# Patient Record
Sex: MALE | Race: WHITE | NOT HISPANIC OR LATINO | Employment: OTHER | URBAN - METROPOLITAN AREA
[De-identification: names, ages, dates, MRNs, and addresses within clinical notes are randomized per-mention and may not be internally consistent; named-entity substitution may affect disease eponyms.]

---

## 2020-06-16 ENCOUNTER — OFFICE VISIT (OUTPATIENT)
Dept: FAMILY MEDICINE CLINIC | Facility: CLINIC | Age: 50
End: 2020-06-16
Payer: COMMERCIAL

## 2020-06-16 VITALS
TEMPERATURE: 98 F | SYSTOLIC BLOOD PRESSURE: 140 MMHG | OXYGEN SATURATION: 96 % | HEIGHT: 71 IN | WEIGHT: 219.6 LBS | DIASTOLIC BLOOD PRESSURE: 95 MMHG | HEART RATE: 97 BPM | RESPIRATION RATE: 16 BRPM | BODY MASS INDEX: 30.74 KG/M2

## 2020-06-16 DIAGNOSIS — Z13.220 SCREENING FOR LIPID DISORDERS: ICD-10-CM

## 2020-06-16 DIAGNOSIS — I10 ESSENTIAL HYPERTENSION: ICD-10-CM

## 2020-06-16 DIAGNOSIS — Z12.5 SCREENING FOR PROSTATE CANCER: ICD-10-CM

## 2020-06-16 DIAGNOSIS — M76.891 TENDONITIS OF KNEE, RIGHT: Primary | ICD-10-CM

## 2020-06-16 DIAGNOSIS — Z13.0 SCREENING FOR ENDOCRINE, METABOLIC AND IMMUNITY DISORDER: ICD-10-CM

## 2020-06-16 DIAGNOSIS — Z13.228 SCREENING FOR ENDOCRINE, METABOLIC AND IMMUNITY DISORDER: ICD-10-CM

## 2020-06-16 DIAGNOSIS — Z13.29 SCREENING FOR ENDOCRINE, METABOLIC AND IMMUNITY DISORDER: ICD-10-CM

## 2020-06-16 DIAGNOSIS — Z13.0 SCREENING FOR DEFICIENCY ANEMIA: ICD-10-CM

## 2020-06-16 DIAGNOSIS — Z13.1 SCREENING FOR DIABETES MELLITUS (DM): ICD-10-CM

## 2020-06-16 PROBLEM — M77.9 TENDONITIS: Status: ACTIVE | Noted: 2020-06-16

## 2020-06-16 PROCEDURE — 99203 OFFICE O/P NEW LOW 30 MIN: CPT | Performed by: FAMILY MEDICINE

## 2020-06-16 PROCEDURE — 3008F BODY MASS INDEX DOCD: CPT | Performed by: FAMILY MEDICINE

## 2020-06-16 PROCEDURE — 3077F SYST BP >= 140 MM HG: CPT | Performed by: FAMILY MEDICINE

## 2020-06-16 PROCEDURE — 3080F DIAST BP >= 90 MM HG: CPT | Performed by: FAMILY MEDICINE

## 2020-07-16 LAB
ALBUMIN SERPL-MCNC: 4.2 G/DL (ref 3.8–4.9)
ALBUMIN/GLOB SERPL: 1.6 {RATIO} (ref 1.2–2.2)
ALP SERPL-CCNC: 48 IU/L (ref 39–117)
ALT SERPL-CCNC: 16 IU/L (ref 0–44)
AST SERPL-CCNC: 18 IU/L (ref 0–40)
BASOPHILS # BLD AUTO: 0.1 X10E3/UL (ref 0–0.2)
BASOPHILS NFR BLD AUTO: 2 %
BILIRUB SERPL-MCNC: 0.3 MG/DL (ref 0–1.2)
BUN SERPL-MCNC: 12 MG/DL (ref 6–24)
BUN/CREAT SERPL: 14 (ref 9–20)
CALCIUM SERPL-MCNC: 9.1 MG/DL (ref 8.7–10.2)
CHLORIDE SERPL-SCNC: 100 MMOL/L (ref 96–106)
CHOLEST SERPL-MCNC: 150 MG/DL (ref 100–199)
CO2 SERPL-SCNC: 23 MMOL/L (ref 20–29)
CREAT SERPL-MCNC: 0.88 MG/DL (ref 0.76–1.27)
EOSINOPHIL # BLD AUTO: 0.1 X10E3/UL (ref 0–0.4)
EOSINOPHIL NFR BLD AUTO: 3 %
ERYTHROCYTE [DISTWIDTH] IN BLOOD BY AUTOMATED COUNT: 13 % (ref 11.6–15.4)
GLOBULIN SER-MCNC: 2.6 G/DL (ref 1.5–4.5)
GLUCOSE SERPL-MCNC: 108 MG/DL (ref 65–99)
HCT VFR BLD AUTO: 43.7 % (ref 37.5–51)
HDLC SERPL-MCNC: 61 MG/DL
HGB BLD-MCNC: 14.7 G/DL (ref 13–17.7)
IMM GRANULOCYTES # BLD: 0 X10E3/UL (ref 0–0.1)
IMM GRANULOCYTES NFR BLD: 1 %
LDLC SERPL CALC-MCNC: 71 MG/DL (ref 0–99)
LDLC/HDLC SERPL: 1.2 RATIO (ref 0–3.6)
LYMPHOCYTES # BLD AUTO: 1.9 X10E3/UL (ref 0.7–3.1)
LYMPHOCYTES NFR BLD AUTO: 36 %
MCH RBC QN AUTO: 30.4 PG (ref 26.6–33)
MCHC RBC AUTO-ENTMCNC: 33.6 G/DL (ref 31.5–35.7)
MCV RBC AUTO: 90 FL (ref 79–97)
MONOCYTES # BLD AUTO: 0.6 X10E3/UL (ref 0.1–0.9)
MONOCYTES NFR BLD AUTO: 11 %
NEUTROPHILS # BLD AUTO: 2.5 X10E3/UL (ref 1.4–7)
NEUTROPHILS NFR BLD AUTO: 47 %
PLATELET # BLD AUTO: 226 X10E3/UL (ref 150–450)
POTASSIUM SERPL-SCNC: 4.6 MMOL/L (ref 3.5–5.2)
PROT SERPL-MCNC: 6.8 G/DL (ref 6–8.5)
PSA FREE MFR SERPL: 17.1 %
PSA FREE SERPL-MCNC: 0.12 NG/ML
PSA SERPL-MCNC: 0.7 NG/ML (ref 0–4)
RBC # BLD AUTO: 4.84 X10E6/UL (ref 4.14–5.8)
SL AMB EGFR AFRICAN AMERICAN: 114 ML/MIN/1.73
SL AMB EGFR NON AFRICAN AMERICAN: 99 ML/MIN/1.73
SL AMB VLDL CHOLESTEROL CALC: 18 MG/DL (ref 5–40)
SODIUM SERPL-SCNC: 138 MMOL/L (ref 134–144)
TRIGL SERPL-MCNC: 88 MG/DL (ref 0–149)
TSH SERPL DL<=0.005 MIU/L-ACNC: 2.69 UIU/ML (ref 0.45–4.5)
WBC # BLD AUTO: 5.2 X10E3/UL (ref 3.4–10.8)

## 2021-05-21 ENCOUNTER — VBI (OUTPATIENT)
Dept: ADMINISTRATIVE | Facility: OTHER | Age: 51
End: 2021-05-21

## 2022-04-26 ENCOUNTER — VBI (OUTPATIENT)
Dept: ADMINISTRATIVE | Facility: OTHER | Age: 52
End: 2022-04-26

## 2023-04-24 ENCOUNTER — VBI (OUTPATIENT)
Dept: ADMINISTRATIVE | Facility: OTHER | Age: 53
End: 2023-04-24

## 2024-02-21 PROBLEM — Z13.29 SCREENING FOR ENDOCRINE, METABOLIC AND IMMUNITY DISORDER: Status: RESOLVED | Noted: 2020-06-16 | Resolved: 2024-02-21

## 2024-02-21 PROBLEM — Z12.5 SCREENING FOR PROSTATE CANCER: Status: RESOLVED | Noted: 2020-06-16 | Resolved: 2024-02-21

## 2024-02-21 PROBLEM — Z13.0 SCREENING FOR DEFICIENCY ANEMIA: Status: RESOLVED | Noted: 2020-06-16 | Resolved: 2024-02-21

## 2024-02-21 PROBLEM — Z13.228 SCREENING FOR ENDOCRINE, METABOLIC AND IMMUNITY DISORDER: Status: RESOLVED | Noted: 2020-06-16 | Resolved: 2024-02-21

## 2024-02-21 PROBLEM — Z13.220 SCREENING FOR LIPID DISORDERS: Status: RESOLVED | Noted: 2020-06-16 | Resolved: 2024-02-21

## 2024-02-21 PROBLEM — Z13.1 SCREENING FOR DIABETES MELLITUS (DM): Status: RESOLVED | Noted: 2020-06-16 | Resolved: 2024-02-21

## 2024-02-21 PROBLEM — Z13.0 SCREENING FOR ENDOCRINE, METABOLIC AND IMMUNITY DISORDER: Status: RESOLVED | Noted: 2020-06-16 | Resolved: 2024-02-21

## 2024-05-01 ENCOUNTER — VBI (OUTPATIENT)
Dept: ADMINISTRATIVE | Facility: OTHER | Age: 54
End: 2024-05-01

## 2024-12-30 ENCOUNTER — PREP FOR PROCEDURE (OUTPATIENT)
Age: 54
End: 2024-12-30

## 2024-12-30 ENCOUNTER — OFFICE VISIT (OUTPATIENT)
Dept: FAMILY MEDICINE CLINIC | Facility: CLINIC | Age: 54
End: 2024-12-30
Payer: COMMERCIAL

## 2024-12-30 ENCOUNTER — TELEPHONE (OUTPATIENT)
Age: 54
End: 2024-12-30

## 2024-12-30 VITALS
TEMPERATURE: 98.3 F | BODY MASS INDEX: 32.09 KG/M2 | HEART RATE: 81 BPM | DIASTOLIC BLOOD PRESSURE: 88 MMHG | SYSTOLIC BLOOD PRESSURE: 136 MMHG | WEIGHT: 224.12 LBS | OXYGEN SATURATION: 98 % | HEIGHT: 70 IN

## 2024-12-30 DIAGNOSIS — J31.0 RHINITIS, UNSPECIFIED TYPE: ICD-10-CM

## 2024-12-30 DIAGNOSIS — Z00.00 ANNUAL PHYSICAL EXAM: Primary | ICD-10-CM

## 2024-12-30 DIAGNOSIS — Z12.11 ENCOUNTER FOR COLORECTAL CANCER SCREENING: ICD-10-CM

## 2024-12-30 DIAGNOSIS — Z12.2 SCREENING FOR LUNG CANCER: ICD-10-CM

## 2024-12-30 DIAGNOSIS — Z12.12 ENCOUNTER FOR COLORECTAL CANCER SCREENING: ICD-10-CM

## 2024-12-30 DIAGNOSIS — M72.2 PLANTAR FASCIITIS OF RIGHT FOOT: ICD-10-CM

## 2024-12-30 DIAGNOSIS — Z12.5 SCREENING FOR PROSTATE CANCER: ICD-10-CM

## 2024-12-30 DIAGNOSIS — B35.3 TINEA PEDIS OF RIGHT FOOT: ICD-10-CM

## 2024-12-30 DIAGNOSIS — Z13.228 SCREENING FOR METABOLIC DISORDER: ICD-10-CM

## 2024-12-30 DIAGNOSIS — Z12.11 SCREENING FOR COLON CANCER: Primary | ICD-10-CM

## 2024-12-30 PROCEDURE — 99203 OFFICE O/P NEW LOW 30 MIN: CPT

## 2024-12-30 PROCEDURE — 99386 PREV VISIT NEW AGE 40-64: CPT

## 2024-12-30 RX ORDER — FLUTICASONE PROPIONATE 50 MCG
1 SPRAY, SUSPENSION (ML) NASAL DAILY
Qty: 16 ML | Refills: 2 | Status: SHIPPED | OUTPATIENT
Start: 2024-12-30

## 2024-12-30 RX ORDER — PREDNISONE 10 MG/1
10 TABLET ORAL SEE ADMIN INSTRUCTIONS
Qty: 20 TABLET | Refills: 0 | Status: SHIPPED | OUTPATIENT
Start: 2024-12-30

## 2024-12-30 NOTE — TELEPHONE ENCOUNTER
Scheduled date of colonoscopy (as of today):01/09/25  Physician performing colonoscopy: Izzy  Location of colonoscopy: [facility]  Bowel prep reviewed with patient: LIZBETH/TINA sent to email  Instructions reviewed with patient by: JOE  Clearances: N/A    : Yoanna Moraes [phone]

## 2024-12-30 NOTE — TELEPHONE ENCOUNTER
12/30/24  Screened by: Violet Duran    Referring Provider Amber    Pre- Screening:     There is no height or weight on file to calculate BMI.  Has patient been referred for a routine screening Colonoscopy? yes  Is the patient between 45-75 years old? yes      Previous Colonoscopy no   If yes:    Date:     Facility:     Reason:       Does the patient want to see a Gastroenterologist prior to their procedure OR are they having any GI symptoms? no    Has the patient been hospitalized or had abdominal surgery in the past 6 months? no    Does the patient use supplemental oxygen? no    Does the patient take Coumadin, Lovenox, Plavix, Elliquis, Xarelto, or other blood thinning medication? no    Has the patient had a stroke, cardiac event, or stent placed in the past year? no      If patient is between 45yrs - 49yrs, please advise patient that we will have to confirm benefits & coverage with their insurance company for a routine screening colonoscopy.

## 2024-12-30 NOTE — PROGRESS NOTES
Adult Annual Physical  Name: Mynor Lima      : 1970      MRN: [id number]  Encounter Provider: Christiana Sauer DO  Encounter Date: 2024   Encounter department: [facility]    Assessment & Plan  Annual physical exam  -colorectal ca screening: DUE, referral to GI given for colonoscopy today  -lung ca screening: DUE, pt is a current 1/2 PPD smoker with 20+ pack-year history  -prostate ca screeening: PSA ordered today  -annual labwork ordered today       Encounter for colorectal cancer screening    Orders:    Ambulatory Referral to Gastroenterology; Future    Ambulatory Referral to Podiatry; Future    Screening for metabolic disorder    Orders:    TSH, 3rd generation with Free T4 reflex; Future    Comprehensive metabolic panel; Future    CBC and Platelet; Future    Lipid panel; Future    Hemoglobin A1C; Future    Screening for prostate cancer    Orders:    PSA Total (Reflex To Free); Future    Screening for lung cancer  I discussed with him that he is a candidate for lung cancer CT screening.     The following Shared Decision-Making points were covered:  Benefits of screening were discussed, including the rates of reduction in death from lung cancer and other causes.  Harms of screening were reviewed, including false positive tests, radiation exposure levels, risks of invasive procedures, risks of complications of screening, and risk of overdiagnosis.  I counseled on the importance of adherence to annual lung cancer LDCT screening, impact of co-morbidities, and ability or willingness to undergo diagnosis and treatment.  I counseled on the importance of maintaining abstinence as a former smoker or was counseled on the importance of smoking cessation if a current smoker    Review of Eligibility Criteria: He meets all of the criteria for Lung Cancer Screening.   He is 54 y.o.   He has 20 pack year tobacco history and is a current smoker or has quit within the past 15 years  He presents no  signs or symptoms of lung cancer    After discussion, the patient decided to elect lung cancer screening.    Orders:    CT lung screening program; Future    Plantar fasciitis of right foot  -suspect plantar fasciitis   -pt would like to try short course of prednisone as it has helped him before, can try prednisone taper   -recommend podiatry referral for eval for orthotics, pt will consider. will place referral today  Orders:    predniSONE 10 mg tablet; Take 1 tablet (10 mg total) by mouth see administration instructions Take 40mg (4 tablets) for 2 days, then take 30mg (3 tablets) for 2 days, then take 20mg (2 tablets) for 2 days, then take 10mg (1 tablet) for 2 days.    Rhinitis, unspecified type    Orders:    fluticasone (FLONASE) 50 mcg/act nasal spray; 1 spray into each nostril daily    Tinea pedis of right foot  -offered prescription for topical antifungal, pt declines stating he has had chronic athletes foot for years it does not bother him       Immunizations and preventive care screenings were discussed with patient today. Appropriate education was printed on patient's after visit summary.    Discussed risks and benefits of prostate cancer screening. We discussed the controversial history of PSA screening for prostate cancer in the [place] as well as the risk of over detection and over treatment of prostate cancer by way of PSA screening.  The patient understands that PSA blood testing is an imperfect way to screen for prostate cancer and that elevated PSA levels in the blood may also be caused by infection, inflammation, prostatic trauma or manipulation, urological procedures, or by benign prostatic enlargement.    The role of the digital rectal examination in prostate cancer screening was also discussed and I discussed with him that there is large interobserver variability in the findings of digital rectal examination.    Counseling:  Alcohol/drug use: discussed moderation in alcohol intake, the  "recommendations for healthy alcohol use, and avoidance of illicit drug use.  Dental Health: discussed importance of regular tooth brushing, flossing, and dental visits.  Injury prevention: discussed safety/seat belts, safety helmets, smoke detectors, carbon monoxide detectors, and smoking near bedding or upholstery.  Exercise: the importance of regular exercise/physical activity was discussed. Recommend exercise 3-5 times per week for at least 30 minutes.          History of Present Illness     Adult Annual Physical:  Patient presents for annual physical. Patient presents to [facility] care, has not been seen in several years and would like to get up to date with his labwork and preventative screenings.  Has one acute complaint:   Fell off a ladder in 2020 and hyperextended his toes on his right foot. Pain at that time improved with course of prednisone. Now with new discomfort on plantar surface of feet, feels like he is \"walking on crumpled up socks\" when he walks sometimes and it looks like the space inbetween his 3rd and 4th toes is greater on the right side than the left side. .     Diet and Physical Activity:  - Diet/Nutrition: well balanced diet.  - Exercise: no formal exercise.    Depression Screening:  - PHQ-2 Score: 0    General Health:  - Sleep: sleeps well, 4-6 hours of sleep on average and snores loudly.  - Hearing: normal hearing bilateral ears.  - Vision: wears contacts, goes for regular eye exams and no vision problems.  - Dental: brushes teeth twice daily and regular dental visits.     Health:  - History of STDs: no.   - Urinary symptoms: none.     Review of Systems   Constitutional:  Negative for chills and fever.   HENT:  Negative for ear pain and sore throat.    Eyes:  Negative for pain and visual disturbance.   Respiratory:  Negative for cough and shortness of breath.    Cardiovascular:  Negative for chest pain and palpitations.   Gastrointestinal:  Negative for abdominal pain and vomiting. " "  Genitourinary:  Negative for dysuria and hematuria.   Musculoskeletal:  Negative for arthralgias and back pain.   Skin:  Negative for color change and rash.   Neurological:  Negative for seizures and syncope.   All other systems reviewed and are negative.        Objective   /88   Pulse 81   Temp 98.3 °F (36.8 °C) (Tympanic)   Ht 5' 10.47\" (1.79 m)   Wt 102 kg (224 lb 1.9 oz)   SpO2 98%   BMI 31.73 kg/m²     Physical Exam  Vitals and nursing note reviewed.   Constitutional:       General: He is not in acute distress.     Appearance: Normal appearance. He is well-developed and normal weight. He is not ill-appearing or toxic-appearing.   HENT:      Head: Normocephalic and atraumatic.      Right Ear: Tympanic membrane, ear canal and external ear normal.      Left Ear: Tympanic membrane, ear canal and external ear normal.      Nose: Nose normal.      Mouth/Throat:      Mouth: Mucous membranes are moist.      Pharynx: Oropharynx is clear. No oropharyngeal exudate or posterior oropharyngeal erythema.   Eyes:      Extraocular Movements: Extraocular movements intact.      Conjunctiva/sclera: Conjunctivae normal.      Pupils: Pupils are equal, round, and reactive to light.   Cardiovascular:      Rate and Rhythm: Normal rate and regular rhythm.      Pulses:           Dorsalis pedis pulses are 2+ on the right side.      Heart sounds: Normal heart sounds. No murmur heard.  Pulmonary:      Effort: Pulmonary effort is normal. No respiratory distress.      Breath sounds: Normal breath sounds. No wheezing, rhonchi or rales.   Abdominal:      General: Bowel sounds are normal. There is no distension.      Palpations: Abdomen is soft.      Tenderness: There is no abdominal tenderness. There is no guarding or rebound.   Musculoskeletal:         General: No swelling. Normal range of motion.      Cervical back: Normal range of motion and neck supple. No rigidity or tenderness.      Right lower leg: No edema.      Left lower " leg: No edema.      Right foot: Normal range of motion. No deformity.   Feet:      Right foot:      Protective Sensation: 10 sites tested.  10 sites sensed.      Skin integrity: Dry skin (dry, peeling skin on plantar surface of foot and inbetween toes) present. No skin breakdown or erythema.      Toenail Condition: Right toenails are abnormally thick. Fungal disease present.  Lymphadenopathy:      Cervical: No cervical adenopathy.   Skin:     General: Skin is warm and dry.      Capillary Refill: Capillary refill takes less than 2 seconds.   Neurological:      General: No focal deficit present.      Mental Status: He is alert and oriented to person, place, and time.   Psychiatric:         Mood and Affect: Mood normal.         Behavior: Behavior normal.

## 2025-01-01 LAB
ALBUMIN SERPL-MCNC: 4.6 G/DL (ref 3.8–4.9)
ALP SERPL-CCNC: 56 IU/L (ref 44–121)
ALT SERPL-CCNC: 16 IU/L (ref 0–44)
AST SERPL-CCNC: 18 IU/L (ref 0–40)
BILIRUB SERPL-MCNC: 0.6 MG/DL (ref 0–1.2)
BUN SERPL-MCNC: 13 MG/DL (ref 6–24)
BUN/CREAT SERPL: 14 (ref 9–20)
CALCIUM SERPL-MCNC: 9.6 MG/DL (ref 8.7–10.2)
CHLORIDE SERPL-SCNC: 102 MMOL/L (ref 96–106)
CHOLEST SERPL-MCNC: 175 MG/DL (ref 100–199)
CHOLEST/HDLC SERPL: 2.8 RATIO (ref 0–5)
CO2 SERPL-SCNC: 19 MMOL/L (ref 20–29)
CREAT SERPL-MCNC: 0.9 MG/DL (ref 0.76–1.27)
EGFR: 100 ML/MIN/1.73
ERYTHROCYTE [DISTWIDTH] IN BLOOD BY AUTOMATED COUNT: 12.6 % (ref 11.6–15.4)
EST. AVERAGE GLUCOSE BLD GHB EST-MCNC: 123 MG/DL
GLOBULIN SER-MCNC: 2.4 G/DL (ref 1.5–4.5)
GLUCOSE SERPL-MCNC: 108 MG/DL (ref 70–99)
HBA1C MFR BLD: 5.9 % (ref 4.8–5.6)
HCT VFR BLD AUTO: 48 % (ref 37.5–51)
HDLC SERPL-MCNC: 62 MG/DL
HGB BLD-MCNC: 15.8 G/DL (ref 13–17.7)
LDLC SERPL CALC-MCNC: 102 MG/DL (ref 0–99)
MCH RBC QN AUTO: 30.6 PG (ref 26.6–33)
MCHC RBC AUTO-ENTMCNC: 32.9 G/DL (ref 31.5–35.7)
MCV RBC AUTO: 93 FL (ref 79–97)
PLATELET # BLD AUTO: 245 X10E3/UL (ref 150–450)
POTASSIUM SERPL-SCNC: 4.4 MMOL/L (ref 3.5–5.2)
PROT SERPL-MCNC: 7 G/DL (ref 6–8.5)
PSA SERPL-MCNC: 1.1 NG/ML (ref 0–4)
RBC # BLD AUTO: 5.17 X10E6/UL (ref 4.14–5.8)
SL AMB REFLEX CRITERIA: NORMAL
SL AMB VLDL CHOLESTEROL CALC: 11 MG/DL (ref 5–40)
SODIUM SERPL-SCNC: 140 MMOL/L (ref 134–144)
TRIGL SERPL-MCNC: 58 MG/DL (ref 0–149)
TSH SERPL DL<=0.005 MIU/L-ACNC: 1.95 UIU/ML (ref 0.45–4.5)
WBC # BLD AUTO: 9.6 X10E3/UL (ref 3.4–10.8)

## 2025-01-06 ENCOUNTER — HOSPITAL ENCOUNTER (OUTPATIENT)
Dept: CT IMAGING | Facility: HOSPITAL | Age: 55
Discharge: HOME | End: 2025-01-06

## 2025-01-06 DIAGNOSIS — Z12.2 SCREENING FOR LUNG CANCER: ICD-10-CM

## 2025-01-07 ENCOUNTER — RESULTS FOLLOW-UP (OUTPATIENT)
Dept: FAMILY MEDICINE CLINIC | Facility: CLINIC | Age: 55
End: 2025-01-07

## 2025-01-07 NOTE — TELEPHONE ENCOUNTER
----- Message from Christiana Sauer DO sent at 1/7/2025  1:24 PM EST -----  Bayron Lowe,   Your kidney function, liver enzymes, electrolytes, blood counts, thyroid, and PSA are all normal and stable.Your blood sugar is a little high. Your A1c, which is a measure of your blood sugar over the past few months, puts you in the prediabetic range. You can lower it through diet and exercise. Dietary wise, you should limit sweets (cookies, cake, candy), sugary beverages such as sweet tea, sodas, and juice. You should also limit carbs such a bread, pasta, and rice. Should aim for 30 minutes of exercise 3-5 times/week. We can repeat your A1c in the office in 3 months, I would recommend you schedule a follow up visit for sometime at the end of March or beginning of April. Please feel free to reach out to our office with any questions or concerns.   -

## 2025-01-09 ENCOUNTER — HOSPITAL ENCOUNTER (OUTPATIENT)
Dept: GASTROENTEROLOGY | Facility: HOSPITAL | Age: 55
Setting detail: OUTPATIENT SURGERY
End: 2025-01-09
Attending: INTERNAL MEDICINE
Payer: COMMERCIAL

## 2025-01-09 ENCOUNTER — ANESTHESIA (OUTPATIENT)
Dept: GASTROENTEROLOGY | Facility: HOSPITAL | Age: 55
End: 2025-01-09
Payer: COMMERCIAL

## 2025-01-09 ENCOUNTER — ANESTHESIA EVENT (OUTPATIENT)
Dept: GASTROENTEROLOGY | Facility: HOSPITAL | Age: 55
End: 2025-01-09
Payer: COMMERCIAL

## 2025-01-09 VITALS
DIASTOLIC BLOOD PRESSURE: 98 MMHG | OXYGEN SATURATION: 98 % | WEIGHT: 217 LBS | SYSTOLIC BLOOD PRESSURE: 136 MMHG | HEIGHT: 71 IN | TEMPERATURE: 97.1 F | RESPIRATION RATE: 17 BRPM | HEART RATE: 93 BPM | BODY MASS INDEX: 30.38 KG/M2

## 2025-01-09 DIAGNOSIS — Z12.11 SCREENING FOR COLON CANCER: ICD-10-CM

## 2025-01-09 PROCEDURE — 88305 TISSUE EXAM BY PATHOLOGIST: CPT | Performed by: PATHOLOGY

## 2025-01-09 PROCEDURE — 45385 COLONOSCOPY W/LESION REMOVAL: CPT | Performed by: INTERNAL MEDICINE

## 2025-01-09 RX ORDER — PROPOFOL 10 MG/ML
INJECTION, EMULSION INTRAVENOUS AS NEEDED
Status: DISCONTINUED | OUTPATIENT
Start: 2025-01-09 | End: 2025-01-09

## 2025-01-09 RX ORDER — SODIUM CHLORIDE 9 MG/ML
INJECTION, SOLUTION INTRAVENOUS CONTINUOUS PRN
Status: DISCONTINUED | OUTPATIENT
Start: 2025-01-09 | End: 2025-01-09

## 2025-01-09 RX ADMIN — PROPOFOL 100 MG: 10 INJECTION, EMULSION INTRAVENOUS at 09:25

## 2025-01-09 RX ADMIN — PROPOFOL 50 MG: 10 INJECTION, EMULSION INTRAVENOUS at 09:27

## 2025-01-09 RX ADMIN — PROPOFOL 50 MG: 10 INJECTION, EMULSION INTRAVENOUS at 09:29

## 2025-01-09 RX ADMIN — PROPOFOL 50 MG: 10 INJECTION, EMULSION INTRAVENOUS at 09:33

## 2025-01-09 RX ADMIN — SODIUM CHLORIDE: 0.9 INJECTION, SOLUTION INTRAVENOUS at 09:11

## 2025-01-09 NOTE — TELEPHONE ENCOUNTER
----- Message from Christiana Sauer, DO sent at 1/9/2025 11:09 AM EST -----  Bayron Lowe,   Your lung CT does not show any lung nodules or masses. It does show some enlargement of the aorta that we will need to monitor. We will do this with a repeat low dose CT in 1 year. Please feel free to reach out to our office with any questions or concerns.   -

## 2025-01-09 NOTE — ANESTHESIA POSTPROCEDURE EVALUATION
Post-Op Assessment Note    CV Status:  Stable    Pain management: adequate       Mental Status:  Alert and awake   Hydration Status:  Euvolemic and stable   PONV Controlled:  None   Airway Patency:  Patent     Post Op Vitals Reviewed: Yes      Staff: CRNA       Last Filed PACU Vitals:  Vitals Value Taken Time   Temp     Pulse 101 01/09/25 0941   /98 01/09/25 0941   Resp 19 01/09/25 0941   SpO2 99 % 01/09/25 0941

## 2025-01-09 NOTE — H&P
"History and Physical -  [facility]  Mynor Lima 54 y.o. male MRN: [id number]    HPI: Mynor Lima is a 54 y.o. year old male who presents for average risk screening colonoscopy    REVIEW OF SYSTEMS: Per the HPI, and otherwise unremarkable.    Historical Information   History reviewed. No pertinent past medical history.  History reviewed. No pertinent surgical history.  Social History   Social History     Substance and Sexual Activity   Alcohol Use None     Social History     Substance and Sexual Activity   Drug Use Not on file     Social History     Tobacco Use   Smoking Status Every Day    Types: Cigarettes   Smokeless Tobacco Current     Family History   Problem Relation Age of Onset    No Known Problems Mother     No Known Problems Father        Meds/Allergies       Current Outpatient Medications:     predniSONE 10 mg tablet    fluticasone (FLONASE) 50 mcg/act nasal spray    Allergies   Allergen Reactions    Bee Venom Anaphylaxis       Objective     /98 (BP Location: Left arm)   Pulse 85   Temp (!) 97.1 °F (36.2 °C) (Temporal)   Resp 22   Ht 5' 10.5\" (1.791 m)   Wt 98.4 kg (217 lb)   SpO2 98%   BMI 30.70 kg/m²     PHYSICAL EXAM    Gen: NAD AAOx3  Head: Normocephalic, Atraumatic  CV: S1S2 RRR no m/r/g  CHEST: Clear b/l no c/r/w  ABD: soft, +BS NT/ND  EXT: no edema    ASSESSMENT/PLAN:  This is a 54 y.o. year old male here for risk screening colonoscopy, and he is stable and optimized for his procedure.        "

## 2025-01-09 NOTE — ANESTHESIA PREPROCEDURE EVALUATION
"Procedure:  COLONOSCOPY    Relevant Problems   CARDIO   (+) Essential hypertension      PULMONARY   (+) Sleep apnea      No official sleep study but patient has been told that he snores and that he \"stops breathing\" at night      Patient on prednisone taper for \"foot issue\"       Physical Exam    Airway    Mallampati score: III  TM Distance: <3 FB  Neck ROM: full     Dental   Comment: None loose     Cardiovascular      Pulmonary      Other Findings        Anesthesia Plan  ASA Score- 2     Anesthesia Type- IV sedation with anesthesia with ASA Monitors.         Additional Monitors:     Airway Plan:     Comment: Last of PO bowel prep: 04:00    Patient educated on the possibility for awareness under sedation and of the possibility of airway intervention in the event of an airway or procedural emergency  .       Plan Factors-Exercise tolerance (METS): >4 METS.    Chart reviewed.    Patient summary reviewed.    Patient is a current smoker.  Patient instructed to abstain from smoking on day of procedure. Patient smoked on day of surgery.            Induction- intravenous.    Postoperative Plan-     Perioperative Resuscitation Plan - Level 1 - Full Code.       Informed Consent- Anesthetic plan and risks discussed with patient.  I personally reviewed this patient with the CRNA. Discussed and agreed on the Anesthesia Plan with the CRNA..        "

## 2025-01-10 NOTE — ANESTHESIA POSTPROCEDURE EVALUATION
Post-Op Assessment Note    CV Status:  Stable  Pain Score: 0    Pain management: adequate       Mental Status:  Alert and awake   Hydration Status:  Stable   PONV Controlled:  None   Airway Patency:  Patent     Post Op Vitals Reviewed: Yes    No anethesia notable event occurred.    Staff: Anesthesiologist           Last Filed PACU Vitals:  Vitals Value Taken Time   Temp     Pulse 93 01/09/25 1000   /98 01/09/25 1000   Resp 17 01/09/25 1000   SpO2 98 % 01/09/25 1000       Modified Charleen:     Vitals Value Taken Time   Activity 2 01/09/25 1001   Respiration 2 01/09/25 1001   Circulation 2 01/09/25 1001   Consciousness 2 01/09/25 1001   Oxygen Saturation 2 01/09/25 1001     Modified Charleen Score: 10

## 2025-01-13 ENCOUNTER — RESULTS FOLLOW-UP (OUTPATIENT)
Dept: GASTROENTEROLOGY | Facility: CLINIC | Age: 55
End: 2025-01-13

## 2025-01-13 PROCEDURE — 88305 TISSUE EXAM BY PATHOLOGIST: CPT | Performed by: PATHOLOGY

## 2025-04-10 ENCOUNTER — OFFICE VISIT (OUTPATIENT)
Dept: PODIATRY | Facility: CLINIC | Age: 55
End: 2025-04-10
Payer: COMMERCIAL

## 2025-04-10 ENCOUNTER — APPOINTMENT (OUTPATIENT)
Dept: RADIOLOGY | Facility: CLINIC | Age: 55
End: 2025-04-10
Payer: COMMERCIAL

## 2025-04-10 VITALS — WEIGHT: 224 LBS | HEIGHT: 71 IN | BODY MASS INDEX: 31.36 KG/M2

## 2025-04-10 DIAGNOSIS — R20.0 NUMBNESS AND TINGLING: Primary | ICD-10-CM

## 2025-04-10 DIAGNOSIS — M79.671 PAIN OF RIGHT FOOT: ICD-10-CM

## 2025-04-10 DIAGNOSIS — G57.61 MORTON'S NEUROMA OF SECOND INTERSPACE OF RIGHT FOOT: ICD-10-CM

## 2025-04-10 DIAGNOSIS — R20.2 NUMBNESS AND TINGLING: Primary | ICD-10-CM

## 2025-04-10 DIAGNOSIS — M79.672 PAIN IN LEFT FOOT: ICD-10-CM

## 2025-04-10 PROCEDURE — 99203 OFFICE O/P NEW LOW 30 MIN: CPT | Performed by: PODIATRIST

## 2025-04-10 PROCEDURE — 73630 X-RAY EXAM OF FOOT: CPT

## 2025-04-10 RX ORDER — GABAPENTIN 100 MG/1
100 CAPSULE ORAL 3 TIMES DAILY
Qty: 90 CAPSULE | Refills: 2 | Status: SHIPPED | OUTPATIENT
Start: 2025-04-10 | End: 2025-07-09

## 2025-04-10 NOTE — PROGRESS NOTES
Name: Mynor Lima      : 1970      MRN: [id number]  Encounter Provider: Cole Landeros DPM  Encounter Date: 4/10/2025   [facility]  :  Assessment & Plan  Numbness and tingling  Treatment options discussed with patient and most probable cause of the paresthesias is from a peripheral neuropathy though right foot is worse than left pain.  Rx gabapentin 100 mg 3 times daily  Discussed the possibility that the right foot pain discomfort may be more associated to the Mendez's neuroma in the more swollen region within the second interspace.  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  Also discussed surgical excision is an option if injections fail.  Recommend ice as needed plantar forefoot region the second interspace in conjunction with wide shoe gear that does not crowd the metatarsals and has adequate padding.       Pain of right foot  Rx x-ray right foot rule out underlying osseous pathology that may be causing pain discomfort on the right foot and the localized induration/swelling.  X-rays personally reviewed show no fracture dislocation or severe degenerative arthritis noted of note tibial and fibular sesamoid bones are bipartite in nature and somewhat hypertrophic.  Also noted is juxta positional attitude of the second and third metatarsals which could contribute narrow confine for the nerve precipitating its neuroma development.  Orders:  •  XR foot 3+ vw right; Future    Pain in left foot             History of Present Illness   HPI  Mynor Lima is a 55 y.o. male who presents painful bilateral feet with numb tingling sensations and what he describes  "as a wrinkled sock sensation under the ball of both feet.  It is slightly worse on the right.  Reports history of injury to the right foot several years ago.  History obtained from: patient    Review of Systems   Constitutional: Negative.    HENT: Negative.     Eyes: Negative.    Respiratory: Negative.     Cardiovascular: Negative.    Gastrointestinal: Negative.    Endocrine: Negative.    Genitourinary: Negative.    Musculoskeletal:         Pain localized to plantar forefoot bilaterally   Allergic/Immunologic: Negative.    Neurological:  Positive for numbness.   Hematological: Negative.    Psychiatric/Behavioral: Negative.       Past Medical History   History reviewed. No pertinent past medical history.  History reviewed. No pertinent surgical history.  Family History   Problem Relation Age of Onset   • No Known Problems Mother    • No Known Problems Father       reports that he has been smoking cigarettes. He has been exposed to tobacco smoke. He uses smokeless tobacco.  Current Outpatient Medications   Medication Instructions   • fluticasone (FLONASE) 50 mcg/act nasal spray 1 spray, Nasal, Daily   • gabapentin (NEURONTIN) 100 mg, Oral, 3 times daily   • predniSONE 10 mg, Oral, See admin instructions, Take 40mg (4 tablets) for 2 days, then take 30mg (3 tablets) for 2 days, then take 20mg (2 tablets) for 2 days, then take 10mg (1 tablet) for 2 days.     Allergies   Allergen Reactions   • Bee Venom Anaphylaxis         Objective   Ht 5' 10.5\" (1.791 m) Comment: stated  Wt 102 kg (224 lb)   BMI 31.69 kg/m²      Physical Exam  Vitals reviewed.   Constitutional:       General: He is not in acute distress.     Appearance: He is well-developed.   HENT:      Head: Normocephalic and atraumatic.      Nose: Nose normal.   Eyes:      Conjunctiva/sclera: Conjunctivae normal.   Cardiovascular:      Rate and Rhythm: Normal rate and regular rhythm.      Pulses: Normal pulses.   Pulmonary:      Effort: Pulmonary effort is normal. "      Breath sounds: No stridor.   Musculoskeletal:         General: Tenderness present. No signs of injury.      Cervical back: Neck supple.      Comments: Pain with palpation right second interspace consistent with a Mendez's neuroma.   Localized induration with slight edema noted within the second interspace right foot.                                                                                                                                                                                                                                                                                                                                                                                             Feet:      Right foot:      Protective Sensation: 10 sites tested.  10 sites sensed.      Left foot:      Protective Sensation: 10 sites tested.  10 sites sensed.   Skin:     General: Skin is warm and dry.      Capillary Refill: Capillary refill takes 2 to 3 seconds.   Neurological:      Mental Status: He is alert.      Sensory: Sensory deficit present.      Comments: Vibratory sensation is diminished 50%, monofilament testing is normal.  Wrinkled sock paresthesias present bilaterally right greater than left.   Psychiatric:         Mood and Affect: Mood normal.

## 2025-05-05 ENCOUNTER — VBI (OUTPATIENT)
Dept: ADMINISTRATIVE | Facility: OTHER | Age: 55
End: 2025-05-05

## 2025-05-05 NOTE — TELEPHONE ENCOUNTER
05/05/25 3:21 PM     Chart reviewed for CRC: Colonoscopy was/were not submitted to the patient's insurance.     Javier Taveras MA   [facility]

## 2025-05-22 ENCOUNTER — OFFICE VISIT (OUTPATIENT)
Dept: PODIATRY | Facility: CLINIC | Age: 55
End: 2025-05-22

## 2025-05-22 VITALS — WEIGHT: 222 LBS | HEIGHT: 71 IN | BODY MASS INDEX: 31.08 KG/M2

## 2025-05-22 DIAGNOSIS — R20.0 NUMBNESS AND TINGLING: ICD-10-CM

## 2025-05-22 DIAGNOSIS — R20.2 NUMBNESS AND TINGLING: ICD-10-CM

## 2025-05-22 RX ORDER — GABAPENTIN 100 MG/1
100 CAPSULE ORAL 3 TIMES DAILY
Qty: 90 CAPSULE | Refills: 2 | Status: SHIPPED | OUTPATIENT
Start: 2025-05-22 | End: 2025-08-20

## 2025-05-22 NOTE — PROGRESS NOTES
":  Assessment & Plan  Numbness and tingling  Are all good progress with adequate control of patient's symptoms with gabapentin.  Continue with current dosing of 200 mg at bedtime and 100 mg in the morning  Gabapentin refill sent to pharmacy  Follow-up in approximately 3 months.  Orders:  •  gabapentin (Neurontin) 100 mg capsule; Take 1 capsule (100 mg total) by mouth 3 (three) times a day        History of Present Illness     Mynor Lima is a 55 y.o. male   5/22/2025: 55-year-old male seen today for follow-up peripheral neuropathy symptoms and paresthesias reporting excellent progress since initiating gabapentin therapy.  Reports he did not notice much early on with just 100 mg dosing at night but now he is taking 200 mg at night and 100 in the morning and it is substantially improving his everyday activities.    4/10/2025:  Mynor Lima is a 55 y.o. male who presents painful bilateral feet with numb tingling sensations and what he describes as a wrinkled sock sensation under the ball of both feet.  It is slightly worse on the right.  Reports history of injury to the right foot several years ago.      Review of Systems   Constitutional: Negative.    HENT: Negative.     Eyes: Negative.    Respiratory: Negative.     Cardiovascular: Negative.    Gastrointestinal: Negative.    Endocrine: Negative.    Genitourinary: Negative.    Musculoskeletal: Negative.    Skin: Negative.    Allergic/Immunologic: Negative.    Neurological:  Positive for numbness.   Hematological: Negative.    Psychiatric/Behavioral: Negative.       Objective   Ht 5' 10.5\" (1.791 m) Comment: stated  Wt 101 kg (222 lb)   BMI 31.40 kg/m²      Physical Exam  Vitals reviewed.   Constitutional:       General: He is not in acute distress.     Appearance: He is well-developed.   HENT:      Head: Normocephalic and atraumatic.      Nose: Nose normal.     Eyes:      Conjunctiva/sclera: Conjunctivae normal.       Cardiovascular:      Rate and Rhythm: Normal rate " and regular rhythm.      Pulses: Normal pulses.   Pulmonary:      Effort: Pulmonary effort is normal.     Musculoskeletal:      Cervical back: Neck supple.   Feet:      Right foot:      Protective Sensation: 10 sites tested.  7 sites sensed.      Left foot:      Protective Sensation: 10 sites tested.  7 sites sensed.     Skin:     General: Skin is warm and dry.      Capillary Refill: Capillary refill takes 2 to 3 seconds.     Neurological:      Mental Status: He is alert and oriented to person, place, and time.      Sensory: Sensory deficit present.      Comments: Overall symptoms have improved, vibratory sensation remains diminished 50%, monofilament normal, paresthesias still present with wrinkled sock sensation but it is less bothersome than it has been in the past.   Psychiatric:         Mood and Affect: Mood normal.